# Patient Record
Sex: MALE | Race: WHITE
[De-identification: names, ages, dates, MRNs, and addresses within clinical notes are randomized per-mention and may not be internally consistent; named-entity substitution may affect disease eponyms.]

---
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
## 2023-03-04 ENCOUNTER — HOSPITAL ENCOUNTER (EMERGENCY)
Dept: HOSPITAL 33 - ED | Age: 23
Discharge: HOME | End: 2023-03-04
Payer: COMMERCIAL

## 2023-03-04 VITALS — OXYGEN SATURATION: 99 %

## 2023-03-04 VITALS — DIASTOLIC BLOOD PRESSURE: 69 MMHG | SYSTOLIC BLOOD PRESSURE: 110 MMHG

## 2023-03-04 VITALS — HEART RATE: 68 BPM

## 2023-03-04 DIAGNOSIS — S91.114A: Primary | ICD-10-CM

## 2023-03-04 DIAGNOSIS — Z28.310: ICD-10-CM

## 2023-03-04 DIAGNOSIS — Z72.0: ICD-10-CM
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
## 2023-03-04 PROCEDURE — 90471 IMMUNIZATION ADMIN: CPT

## 2023-03-04 PROCEDURE — 99282 EMERGENCY DEPT VISIT SF MDM: CPT

## 2023-03-04 PROCEDURE — 90715 TDAP VACCINE 7 YRS/> IM: CPT

## 2023-03-04 NOTE — ERPHSYRPT
- History of Present Illness


Source: patient, other (Mother)


Exam Limitations: no limitations


Patient Subjective Stated Complaint: pt states "I cut my toe on one of the old 

school battery chargers."


Triage Nursing Assessment: pt ambulatory to bed by self, pt alert and oriented 

x3, pt has cotton balls on R 3rd toe on foot, pt has 1 cm of skin avulsion on R 

3rd toe of foot, bleeding controlled, tetanus not utd, wound care applied upon 

triage


Physician History: 





22 yo wm w avulsion injury to R ventral 3rd toe after he stepped on object in 

garage before ER arrival. Pt denies other injuries at this time. Tetanus in not 

UTD. Pain is mild to moderate. 


Method of Injury: incised


Occurred: just prior to arrival


Quality: constant


Severity of Pain-Max: moderate


Severity of Pain-Current: mild


Lower Extremities Pain: 3rd toe: right


Modifying Factors: Improves With: movement


Associated Symptoms: none


Allergies/Adverse Reactions: 








No Known Drug Allergies Allergy (Verified 03/04/23 02:27)


   





Hx Tetanus, Diphtheria Vaccination/Date Given: No


Hx Influenza Vaccination/Date Given: No


Hx Pneumococcal Vaccination/Date Given: No


Immunizations Up to Date: No





Travel Risk





- International Travel


Have you traveled outside of the country in past 3 weeks: No





- Coronavirus Screening


Are you exhibiting any of the following symptoms?: No


Close contact with a COVID-19 positive Pt in past 14-21 Days: No





- Vaccine Status


Have you recieved a Covid-19 vaccination: No





- Review of Systems


Constitutional: No Symptoms


Eyes: No Symptoms


Ears, Nose, & Throat: No Symptoms


Respiratory: No Symptoms


Cardiac: No Symptoms


Abdominal/Gastrointestinal: No Symptoms


Genitourinary Symptoms: No Symptoms


Skin: No Symptoms


Neurological: No Symptoms


Psychological: No Symptoms


Endocrine: No Symptoms


Hematologic/Lymphatic: No Symptoms


Immunological/Allergic: No Symptoms





- Past Medical History


Pertinent Past Medical History: Yes


Neurological History: No Pertinent History


ENT History: No Pertinent History


Cardiac History: No Pertinent History


Respiratory History: No Pertinent History


Endocrine Medical History: No Pertinent History


Musculoskeletal History: No Pertinent History


GI Medical History: No Pertinent History


 History: No Pertinent History


Psycho-Social History: No Pertinent History


Male Reproductive Disorders: No Pertinent History


Other Medical History: Asbergers





- Past Surgical History


Past Surgical History: No





- Social History


Smoking Status: Current every day smoker


Exposure to second hand smoke: Yes


Drug Use: none


Patient Lives Alone: No





- Nursing Vital Signs


Nursing Vital Signs: 


                               Initial Vital Signs











Temperature  98.5 F   03/04/23 02:27


 


Pulse Rate  65   03/04/23 02:27


 


Respiratory Rate  18   03/04/23 02:27


 


Blood Pressure  110/69   03/04/23 02:27


 


O2 Sat by Pulse Oximetry  99   03/04/23 02:27








                                   Pain Scale











Pain Intensity                 2











WNL





- Physical Exam


General Appearance: no apparent distress


Eyes, Ears, Nose, Throat Exam: normal ENT inspection, TMs normal, pharynx normal

, moist mucous membranes


Neck Exam: normal inspection, non-tender, supple, full range of motion, No 

Brudzinski, No Kernig's, No meningismus


Cardiovascular/Respiratory Exam: normal breath sounds, regular rate/rhythm, 

heart sounds normal


Gastrointestinal/Abdominal Exam: non-tender, soft


Back Exam: normal inspection, normal range of motion, No CVA tenderness, No 

vertebral tenderness


Hips Exam: bilateral: non-tender, normal inspection, normal range of motion, no 

evidence of injury


Legs Exam: bilateral leg: non-tender, normal inspection, normal range of motion,

no evidence of injury


Knees Exam: bilateral knee: non-tender, normal inspection, normal range of 

motion, no evidence of injury


Ankle Exam: bilateral ankle: non-tender, normal inspection, normal range of 

motion, no evidence of injury


Foot Exam: right foot: other (Foot hygiene extremely poor/Superficial avulsion L

3rd ventral toe/Good hemostasis/Good distal capillary return and sensation)


Neuro/Tendon Exam: normal sensation, normal motor functions, normal tendon 

functions, responds to pain, no evidence tendon injury, No motor deficit, No 

sensory deficit


Mental Status Exam: alert, oriented x 3, cooperative


Skin Exam: normal color, warm, dry


**SpO2 Interpretation**: normal


SpO2: 99


O2 Delivery: Room Air





- Course


Nursing assessment & vital signs reviewed: Yes


Ordered Tests: 


Medication Summary














Discontinued Medications














Generic Name Dose Route Start Last Admin





  Trade Name Freq  PRN Reason Stop Dose Admin


 


Diphtheria/Tetanus/Acell Pertussis  0.5 ml  03/04/23 02:34  03/04/23 02:41





  Tdap --Diph,Pertuss(Acell),Tet Vac/Pf 0.5 Ml Vial  IM  03/04/23 02:35  0.5 ml





  .ONCE ONE   Administration


 


Diphtheria/Tetanus/Acell Pertussis  Confirm  03/04/23 02:39 





  Tdap --Diph,Pertuss(Acell),Tet Vac/Pf 0.5 Ml Vial  Administered  03/04/23 

02:40 





  Dose  





  0.5 ml  





  IM  





  .STK-MED ONE  














- Progress


Progress: improved


Progress Note: 





03/04/23 02:47


Nursing note and vital signs reviewed


No food or housing insecurities noted


Tdap given


Superficial ventral skin avulsion of L 3rd toe cleansed w Hibiclens, antibiotic 

ointment applied , and bandaged per nursing


Pt advised that he might lose the flap


Additional history per nursing


Very poor feet hygiene


03/04/23 02:53





03/04/23 02:59





Counseled pt/family regarding: diagnosis, need for follow-up





Medical Desision Making





- Independent Historian


Additional History obtained from: Mother





- Risk of complications


Minimal Risk: Minimal risk of morbidity





- Departure


Departure Disposition: Home


Clinical Impression: 


 Avulsion of skin of toe





Condition: Stable


Critical Care Time: No


Referrals: 


BHAVIN LANGSTON [Primary Care Provider] - Follow up/PCP as directed


Instructions:  Wound Care (DC)


Additional Instructions: 


Wash skin avulsion twice a day with soap/water


Apply antibiotic ointment


Keep area clean and bandaged


Watch for signs of infection-increasing redness, pus, increasing pain, or 

temperature greater than 100.5


Start Keflex three times a day for 7 days


Follow up with your family MD as needed


Prescriptions: 


Cephalexin Mh 500 mg** [Keflex 500 mg**] 500 mg PO TID 7 Days #21 cap

## 2023-03-16 ENCOUNTER — HOSPITAL ENCOUNTER (EMERGENCY)
Dept: HOSPITAL 33 - ED | Age: 23
Discharge: HOME | End: 2023-03-16
Payer: COMMERCIAL

## 2023-03-16 VITALS — SYSTOLIC BLOOD PRESSURE: 126 MMHG | DIASTOLIC BLOOD PRESSURE: 83 MMHG | OXYGEN SATURATION: 98 %

## 2023-03-16 VITALS — HEART RATE: 76 BPM

## 2023-03-16 DIAGNOSIS — Z28.310: ICD-10-CM

## 2023-03-16 DIAGNOSIS — K02.9: ICD-10-CM

## 2023-03-16 DIAGNOSIS — K04.7: Primary | ICD-10-CM

## 2023-03-16 DIAGNOSIS — K08.89: ICD-10-CM

## 2023-03-16 DIAGNOSIS — Z72.0: ICD-10-CM

## 2023-03-16 PROCEDURE — 99283 EMERGENCY DEPT VISIT LOW MDM: CPT

## 2023-03-16 PROCEDURE — 96372 THER/PROPH/DIAG INJ SC/IM: CPT

## 2023-03-16 NOTE — ERPHSYRPT
- History of Present Illness


Time Seen by Provider: 23 19:41


Source: patient


Exam Limitations: no limitations


Patient Subjective Stated Complaint: pt states I think I have a abscess. The 

pain starts in my lower jaw and goes to my ear


Triage Nursing Assessment: pt ambulated into the er; pt is axo x3; c/o tooth 

pain; no respiratory distress present; skin PDW; multiple tooth caries; broken 

tooth present to rt lower molar; afebrile; vitals wnl


Physician History: 


Patient is a 23-year-old male presents to our ED with pain to his right 

mandibular molar.  Pain is gotten significantly worse over the past couple days.

 Patient feels pain shoot into side of his head.  Pain reproduced with palpation

to the gingiva adjacent to tooth #32.





No blunt trauma.  No fever.  No nausea vomiting or diaphoresis.  Patient has mul

tiple carious and cracked teeth.  Patient states he is otherwise healthy.  

Mother at bedside.  They voiced no other complaints or concerns at this time.





Portions of this note were created with voice recognition technology.  There may

be grammatical, spelling, punctuation or sound alike errors





Timing/Duration: day(s) (2 days)


Severity: moderate


Modifying Factors: Improves With: nothing


Associated Symptoms: denies symptoms


Allergies/Adverse Reactions: 








No Known Drug Allergies Allergy (Verified 23 19:18)


   





Hx Tetanus, Diphtheria Vaccination/Date Given: No


Hx Influenza Vaccination/Date Given: No


Hx Pneumococcal Vaccination/Date Given: No





Travel Risk





- International Travel


Have you traveled outside of the country in past 3 weeks: No





- Coronavirus Screening


Are you exhibiting any of the following symptoms?: No


Close contact with a COVID-19 positive Pt in past 14-21 Days: No





- Vaccine Status


Have you recieved a Covid-19 vaccination: No





- Review of Systems


Constitutional: No Symptoms, No Fever, No Chills


Eyes: No Symptoms


Ears, Nose, & Throat: No Symptoms


Respiratory: No Symptoms, No Cough, No Dyspnea


Cardiac: No Symptoms, No Chest Pain, No Edema, No Syncope


Abdominal/Gastrointestinal: No Symptoms, No Abdominal Pain, No Nausea, No 

Vomiting, No Diarrhea


Genitourinary Symptoms: No Symptoms, No Dysuria


Musculoskeletal: No Symptoms, No Back Pain, No Neck Pain


Skin: No Symptoms, No Rash


Neurological: No Symptoms, No Dizziness, No Focal Weakness, No Sensory Changes


Psychological: No Symptoms


Endocrine: No Symptoms


Hematologic/Lymphatic: No Symptoms


Immunological/Allergic: No Symptoms


All Other Systems: Reviewed and Negative





- Past Medical History


Pertinent Past Medical History: Yes


Neurological History: No Pertinent History


ENT History: No Pertinent History


Cardiac History: No Pertinent History


Respiratory History: No Pertinent History


Endocrine Medical History: No Pertinent History


Musculoskeletal History: No Pertinent History


GI Medical History: No Pertinent History


 History: No Pertinent History


Psycho-Social History: No Pertinent History


Male Reproductive Disorders: No Pertinent History


Other Medical History: Asbergers





- Past Surgical History


Past Surgical History: No





- Social History


Smoking Status: Current every day smoker


Exposure to second hand smoke: Yes


Drug Use: none


Patient Lives Alone: No





- Nursing Vital Signs


Nursing Vital Signs: 





                               Initial Vital Signs











Temperature  98.6 F   23 19:19


 


Pulse Rate  71   23 19:19


 


Respiratory Rate  14   23 19:19


 


Blood Pressure  126/83   23 19:19


 


O2 Sat by Pulse Oximetry  98   23 19:19








                                   Pain Scale











Pain Intensity                 7

















- Physical Exam


General Appearance: no apparent distress, alert


Eye Exam: PERRL/EOMI, eyes nml inspection


Ears, Nose, Throat Exam: normal ENT inspection, TMs normal, pharynx normal, 

moist mucous membranes, other (Multiple carious teeth gingival tenderness.  

Possible periapical abscess.  Uvula midline.  No sublingual masses.  No open or 

draining lesions.)


Neck Exam: normal inspection, non-tender, supple, full range of motion


Respiratory Exam: normal breath sounds, lungs clear, airway intact, No 

respiratory distress


Cardiovascular Exam: regular rate/rhythm, normal heart sounds, normal peripheral

pulses


Gastrointestinal/Abdomen Exam: soft, normal bowel sounds, No tenderness, No mass


Back Exam: normal inspection, normal range of motion, No CVA tenderness, No 

vertebral tenderness


Extremity Exam: normal inspection, normal range of motion, pelvis stable


Neurologic Exam: alert, oriented x 3, cooperative, normal mood/affect, nml 

cerebellar function, nml station & gait, sensation nml, No motor deficits


Skin Exam: normal color, warm, dry, No rash


Lymphatic Exam: No adenopathy


**SpO2 Interpretation**: normal


SpO2: 98


O2 Delivery: Room Air





- Course


Nursing assessment & vital signs reviewed: Yes


Ordered Tests: 





Medication Summary











Generic Name Dose Route Start Last Admin





  Trade Name Freq  PRN Reason Stop Dose Admin


 


Ceftriaxone Sodium  1,000 mg  23 19:40 





  Ceftriaxone Sodium 1000 Mg Inj Vial  IM  23 19:41 





  STAT ONE  














Discontinued Medications














Generic Name Dose Route Start Last Admin





  Trade Name Freq  PRN Reason Stop Dose Admin


 


Ketorolac Tromethamine  30 mg  23 19:39 





  Ketorolac Tromethamine 30 Mg/Ml Inj  IM  23 19:40 





  STAT ONE  














- Progress


Progress: improved


Progress Note: 


Patient presents to our ED with dental pain.  Multiple carious teeth and cracked

teeth as well.  Patient appears to have a dental abscess.  Patient received a 

dose of Rocephin IM.  A dose of Toradol IM provided as well.  A prescription for

Toradol and Augmentin was forwarded to patient's pharmacy.  Patient will follow-

up with his dentist within 48 hours.











Age gender, PMH/PQ , (co-morbidities that complicate presentation) , 


med class, PSH, (smoker) method of arrival, CC (acute, chronic or acute on 

chronic), 


State COPA level and why or if critical.  vitals, Significant ROS/PE findings, 

working diagnoses, 





Complexity of problems addressed is low.  Patient symptom is acute 

uncomplicated.  No critical care time.


Complexity of data reviewed is none.  No specialized testing ordered.


Diagnoses based on history and physical examination.





Risk of complications and or risk morbidity/mortality of patient management is 

moderate.  Patient received prescription for Toradol and Augmentin.





Discharge home.  Patient will follow-up with them within 48 hours.  Mother at 

bedside.  They voiced no other complaints concerns at this time.





Portions of this note were created with voice recognition technology.  There may

be grammatical, spelling, punctuation or sound alike errors











23 19:46





Counseled pt/family regarding: diagnosis, need for follow-up





- Departure


Departure Disposition: Home


Clinical Impression: 


 Pain, dental, Dental abscess, Carious teeth





Condition: Stable


Critical Care Time: No


Referrals: 


BHAVIN LANGSTON [Primary Care Provider] - Follow up/PCP as directed


Additional Instructions: 


Discharge/Care Plan





JAYSONARMANI VILLATORO was seen on 23 in the Emergency Room. The patient was 

counseled regarding Diagnosis,Lab results, Imaging studies, need for follow up 

and when to return to the Emergency Room.





Prescriptions given:





Discharge Note





I have spoken with the patient and/or caregivers. I have explained the patient's

condition, diagnosis and treatment plan based on the information available to me

at this time. I have answered the patient's and/or caregiver's questions and 

addressed any concerns. The patient and/or caregivers have as good understanding

of the patient's diagnosis, condition and treatment plan as can be expected at 

this point. The vital signs have been stable. The patient's condition is stable 

and appropriate for discharge from the emergency department.





The patient will pursue further outpatient evaluation with the primary care 

physician or other designated or consulting physician as outlined in the 

discharge instructions. The patient and/or caregivers are agreeable to this plan

of care and follow-up instructions have been explained in detail. The patient 

and/or caregivers have received these instruction. The patient/and or caregivers

are aware that any significant change in condition or worsening of symptoms 

should prompt an immediate return to this or the closest emergency department or

call 911. 








Prescriptions: 


Amox Tr/Potass Clav. 875 mg*** [Augmentin 875-125 Tablet***] 875 mg PO BID 7 

Days #14 tablet


Ketorolac Trometh 10 mg Tab** [TORAdol 10 MG TABLET***] 10 mg PO TID 5 Days #15 

tablet